# Patient Record
Sex: FEMALE | Race: BLACK OR AFRICAN AMERICAN | URBAN - METROPOLITAN AREA
[De-identification: names, ages, dates, MRNs, and addresses within clinical notes are randomized per-mention and may not be internally consistent; named-entity substitution may affect disease eponyms.]

---

## 2017-06-01 ENCOUNTER — ED HISTORICAL/CONVERTED ENCOUNTER (OUTPATIENT)
Dept: OTHER | Age: 37
End: 2017-06-01

## 2018-03-31 LAB
CREATININE, EXTERNAL: 0.67
LDL-C, EXTERNAL: 90

## 2018-04-01 LAB — HBA1C MFR BLD HPLC: 15.3 %

## 2018-05-09 ENCOUNTER — OFFICE VISIT (OUTPATIENT)
Dept: ENDOCRINOLOGY | Age: 38
End: 2018-05-09

## 2018-05-09 VITALS
HEIGHT: 64 IN | WEIGHT: 181.9 LBS | BODY MASS INDEX: 31.05 KG/M2 | RESPIRATION RATE: 16 BRPM | OXYGEN SATURATION: 95 % | DIASTOLIC BLOOD PRESSURE: 85 MMHG | SYSTOLIC BLOOD PRESSURE: 129 MMHG | TEMPERATURE: 97.3 F | HEART RATE: 93 BPM

## 2018-05-09 DIAGNOSIS — E11.65 UNCONTROLLED TYPE 2 DIABETES MELLITUS WITH HYPERGLYCEMIA, WITH LONG-TERM CURRENT USE OF INSULIN (HCC): ICD-10-CM

## 2018-05-09 DIAGNOSIS — Z79.4 UNCONTROLLED TYPE 2 DIABETES MELLITUS WITH HYPERGLYCEMIA, WITH LONG-TERM CURRENT USE OF INSULIN (HCC): ICD-10-CM

## 2018-05-09 DIAGNOSIS — E11.65 TYPE 2 DIABETES MELLITUS WITH HYPERGLYCEMIA, WITH LONG-TERM CURRENT USE OF INSULIN (HCC): Primary | ICD-10-CM

## 2018-05-09 DIAGNOSIS — Z79.4 TYPE 2 DIABETES MELLITUS WITH HYPERGLYCEMIA, WITH LONG-TERM CURRENT USE OF INSULIN (HCC): Primary | ICD-10-CM

## 2018-05-09 DIAGNOSIS — E78.2 MIXED HYPERLIPIDEMIA: ICD-10-CM

## 2018-05-09 DIAGNOSIS — I10 ESSENTIAL HYPERTENSION WITH GOAL BLOOD PRESSURE LESS THAN 140/90: ICD-10-CM

## 2018-05-09 PROBLEM — E11.40 TYPE 2 DIABETES MELLITUS WITH DIABETIC NEUROPATHY (HCC): Status: ACTIVE | Noted: 2018-05-09

## 2018-05-09 RX ORDER — FENOFIBRATE 48 MG/1
48 TABLET, COATED ORAL DAILY
COMMUNITY

## 2018-05-09 RX ORDER — INSULIN ASPART 100 [IU]/ML
INJECTION, SUSPENSION SUBCUTANEOUS
COMMUNITY
End: 2018-05-09 | Stop reason: ALTCHOICE

## 2018-05-09 RX ORDER — ATORVASTATIN CALCIUM 10 MG/1
10 TABLET, FILM COATED ORAL DAILY
COMMUNITY

## 2018-05-09 RX ORDER — LANCETS 28 GAUGE
EACH MISCELLANEOUS
Qty: 100 LANCET | Refills: 11 | Status: SHIPPED | OUTPATIENT
Start: 2018-05-09

## 2018-05-09 RX ORDER — INSULIN PUMP SYRINGE, 3 ML
EACH MISCELLANEOUS
Qty: 1 KIT | Refills: 0 | Status: SHIPPED | OUTPATIENT
Start: 2018-05-09

## 2018-05-09 NOTE — PATIENT INSTRUCTIONS
Check blood sugars before breakfast and at bedtime. Low blood glucose is less than 70     Maintain the log and bring it all your appointments    If the bedtime sugars are less than 100 ,eat a 15 gm snack. Start Janumet 1 tab twice day ( s top Metformin )     NPH ( Novolin N ) 40 units in AM  and 20 units at bed time ( stop Novolin 70/30)     Additional Novolin R ( fast acting ) for  high blood sugars     150-200 mg   3 units   201-250 mg   6 units   251-300 mg   9 units   301-350 mg   12 units   351-400 mg   15 units         Exercising for 30 minutes at least 5 days per week has been shown to increase the lifespan of diabetics. We encourage an active lifestyle that includes regular exercise. You may benefit from the [facility] #[phone]     For diet information go to www. EATRIGHT. org     Diabetes is the leading cause of blindness in the [place] It is important that you see an eye doctor every year for a dilated retinal exam     Diabetes is the leading cause of amputations in the [place] It is very important that you keep an eye on the condition of you feet. Look for any cuts, calluses, ulcers, fungal infections, rashes, or nail problems. Diabetics need to be seen several times a year by their physician for fasting labs and monitoring of their diabetes. Prevention is the key to keeping diabetics out of trouble     Obtain a flu shot each Fall      What should you know about eating carbs? Managing the amount of carbohydrate (carbs) you eat is an important part of healthy meals when you have diabetes. Carbohydrate is found in many foods. · Learn which foods have carbs. And learn the amounts of carbs in different foods. · Bread, cereal, pasta, and rice have about 15 grams of carbs in a serving. A serving is 1 slice of bread (1 ounce), ½ cup of cooked cereal, or 1/3 cup of cooked pasta or rice. · Fruits have 15 grams of carbs in a serving.  A serving is 1 small fresh fruit, such as an apple or orange; ½ of a banana; ½ cup of cooked or canned fruit; ½ cup of fruit juice; 1 cup of melon or raspberries; or 2 tablespoons of dried fruit. · Milk and no-sugar-added yogurt have 15 grams of carbs in a serving. A serving is 1 cup of milk or 2/3 cup of no-sugar-added yogurt. · Starchy vegetables have 15 grams of carbs in a serving. A serving is ½ cup of mashed potatoes or sweet potato; 1 cup winter squash; ½ of a small baked potato; ½ cup of cooked beans; or ½ cup cooked corn or green peas. · Learn how much carbs to eat each day and at each meal. A dietitian or CDE can teach you how to keep track of the amount of carbs you eat. This is called carbohydrate counting. · If you are not sure how to count carbohydrate grams, use the Plate Method to plan meals. It is a good, quick way to make sure that you have a balanced meal. It also helps you spread carbs throughout the day. · Divide your plate by types of foods. Put non-starchy vegetables on half the plate, meat or other protein food on one-quarter of the plate, and a grain or starchy vegetable in the final quarter of the plate. To this you can add a small piece of fruit and 1 cup of milk or yogurt, depending on how many carbs you are supposed to eat at a meal.  · Try to eat about the same amount of carbs at each meal. Do not \"save up\" your daily allowance of carbs to eat at one meal.  · Proteins have very little or no carbs per serving. Examples of proteins are beef, chicken, turkey, fish, eggs, tofu, cheese, cottage cheese, and peanut butter. A serving size of meat is 3 ounces, which is about the size of a deck of cards. Examples of meat substitute serving sizes (equal to 1 ounce of meat) are 1/4 cup of cottage cheese, 1 egg, 1 tablespoon of peanut butter, and ½ cup of tofu. How can you eat out and still eat healthy? · Learn to estimate the serving sizes of foods that have carbohydrate.  If you measure food at home, it will be easier to estimate the amount in a serving of restaurant food. · If you eat more carbohydrate at a meal than you had planned, take a walk or do other exercise. This will help lower your blood sugar. Please remember to bring your meter and/or log to every visit. Also, be sure to have a dilated diabetic eye exam done annually. Refills -Please call your pharmacy and have them send us a refill request.  Results - Allow up to a week for lab results to be processed and reviewed. Phone calls - Allow up to 24 hours for non-urgent calls to be returned. Prior Authorization - May take up to 2 weeks to process depending on your insurance. Forms - FMLA, DMV, Patient Assistance, etc. will take up to 2 weeks to process. Cancellations - Please notify the office in advance if you cannot keep your appointment. Samples - Will only be dispensed at visits as supplies are limited. If you are having a medical emergency, call 911.

## 2018-05-09 NOTE — MR AVS SNAPSHOT
[address] [facility] [address] 
[phone] Patient: Shyanne [facility] MRN: [id number] Visit Information Date & Time Provider Department Dept. Phone Encounter #  
 5/9/2018 10:30 AM Maryann Ramirez MD [facility] [phone] [id number] Follow-up Instructions Return in about 3 months (around 8/9/2018). Upcoming Health Maintenance Date Due  
 FOOT EXAM Q1 2/10/1988 EYE EXAM RETINAL OR DILATED Q1 2/10/1988 Pneumococcal 19-64 Medium Risk (1 of 1 - PPSV23) 2/10/1997 DTaP/Tdap/Td series (1 - Tdap) 2/10/1999 PAP AKA CERVICAL CYTOLOGY 2/10/1999 HEMOGLOBIN A1C Q6M 8/19/2016 MICROALBUMIN Q1 2/19/2017 LIPID PANEL Q1 2/19/2017 Influenza Age 5 to Adult 8/1/2018 Allergies as of 5/9/2018  Review Complete On: 5/9/2018 By: Nguyễn Evans LPN No Known Allergies Current Immunizations  Never Reviewed No immunizations on file. Not reviewed this visit You Were Diagnosed With   
  
 Codes Comments Type 2 diabetes mellitus with hyperglycemia, with long-term current use of insulin (HCC)    -  Primary ICD-10-CM: E11.65, Z79.4 ICD-9-CM: 250.00, 790.29, V58.67 Mixed hyperlipidemia     ICD-10-CM: E78.2 ICD-9-CM: 272.2 Essential hypertension with goal blood pressure less than 140/90     ICD-10-CM: I10 
ICD-9-CM: 401.9 Vitals BP Pulse Temp Resp Height(growth percentile) Weight(growth percentile) 129/85 (BP 1 Location: Left arm, BP Patient Position: Sitting) 93 97.3 °F (36.3 °C) (Oral) 16 5' 4\" (1.626 m) 181 lb 14.4 oz (82.5 kg) SpO2 BMI OB Status Smoking Status 95% 31.22 kg/m2 Having regular periods Never Smoker Vitals History BMI and BSA Data Body Mass Index Body Surface Area  
 31.22 kg/m 2 1.93 m 2 Preferred Pharmacy Pharmacy Name Phone  [facility], [address] [place] [address] [address] [place] [address] [phone] Your Updated Medication List  
  
   
This list is accurate as of 5/9/18 11:20 AM.  Always use your most recent med list.  
  
  
  
  
 atorvastatin 10 mg tablet Commonly known as:  LIPITOR Take 10 mg by mouth daily. Blood-Glucose Meter monitoring kit Test blood glucose 3 times daily Dx Code: E11.65  
  
 fenofibrate nanocrystallized 48 mg tablet Commonly known as:  [employer] Take 48 mg by mouth daily. fexofenadine 180 mg tablet Commonly known as:  Katie Munda Take 180 mg by mouth daily. gabapentin 300 mg capsule Commonly known as:  NEURONTIN Take 300 mg by mouth three (3) times daily. glipiZIDE 10 mg tablet Commonly known as:  Luis Angeljennimargarette  Take 10 mg by mouth daily. glucose blood VI test strips strip Commonly known as:  blood glucose test  
Test blood glucose 3 times daily Dx Code: E11.65  
  
 insulin  unit/mL injection Commonly known as:  NovoLIN N NPH U-100 Insulin Inject 40 units in AM  and 20 units at bed time ( stop Novolin 70/30 )  
  
 insulin regular 100 unit/mL injection Commonly known as:  NovoLIN R Regular U-100 Insuln Use with sliding scale Max units daily: 45  
  
 insulin syringe-needle U-100 1 mL 31 gauge x 15/64\" Syrg 1 Syringe by Does Not Apply route four (4) times daily. Lancets Misc Test blood glucose 3 times daily Dx Code: E11.65  
  
 lisinopril 10 mg tablet Commonly known as:  Reinier Handing Take 10 mg by mouth daily. lovastatin 20 mg tablet Commonly known as:  MEVACOR Take 20 mg by mouth nightly. metFORMIN 1,000 mg tablet Commonly known as:  GLUCOPHAGE Take 1,000 mg by mouth two (2) times daily (with meals). NovoLOG Mix 70-30FlexPen U-100 100 unit/mL (70-30) Inpn Generic drug:  insulin aspart protamine/insulin aspart  
by SubCUTAneous route. 20 units in the am and 80 units QHS Follow-up Instructions  Return in about 3 months (around 8/9/2018). Patient Instructions Check blood sugars before breakfast and at bedtime. Low blood glucose is less than 70 Maintain the log and bring it all your appointments If the bedtime sugars are less than 100 ,eat a 15 gm snack. Start Janumet 1 tab twice day ( s top Metformin )  
 
NPH ( Novolin N ) 40 units in AM  and 20 units at bed time ( stop Novolin 70/30) Additional Novolin R ( fast acting ) for  high blood sugars 150-200 mg   3 units 201-250 mg   6 units 251-300 mg   9 units 301-350 mg   12 units 351-400 mg   15 units Exercising for 30 minutes at least 5 days per week has been shown to increase the lifespan of diabetics. We encourage an active lifestyle that includes regular exercise. You may benefit from the [facility] at [facility] #[phone] For diet information go to www. EATRIGHT. org  
 
Diabetes is the leading cause of blindness in the [place] It is important that you see an eye doctor every year for a dilated retinal exam  
 
Diabetes is the leading cause of amputations in the [place] It is very important that you keep an eye on the condition of you feet. Look for any cuts, calluses, ulcers, fungal infections, rashes, or nail problems. Diabetics need to be seen several times a year by their physician for fasting labs and monitoring of their diabetes. Prevention is the key to keeping diabetics out of trouble Obtain a flu shot each Fall What should you know about eating carbs? Managing the amount of carbohydrate (carbs) you eat is an important part of healthy meals when you have diabetes. Carbohydrate is found in many foods. · Learn which foods have carbs. And learn the amounts of carbs in different foods. · Bread, cereal, pasta, and rice have about 15 grams of carbs in a serving. A serving is 1 slice of bread (1 ounce), ½ cup of cooked cereal, or 1/3 cup of cooked pasta or rice. · Fruits have 15 grams of carbs in a serving.  A serving is 1 small fresh fruit, such as an apple or orange; ½ of a banana; ½ cup of cooked or canned fruit; ½ cup of fruit juice; 1 cup of melon or raspberries; or 2 tablespoons of dried fruit. · Milk and no-sugar-added yogurt have 15 grams of carbs in a serving. A serving is 1 cup of milk or 2/3 cup of no-sugar-added yogurt. · Starchy vegetables have 15 grams of carbs in a serving. A serving is ½ cup of mashed potatoes or sweet potato; 1 cup winter squash; ½ of a small baked potato; ½ cup of cooked beans; or ½ cup cooked corn or green peas. · Learn how much carbs to eat each day and at each meal. A dietitian or CDE can teach you how to keep track of the amount of carbs you eat. This is called carbohydrate counting. · If you are not sure how to count carbohydrate grams, use the Plate Method to plan meals. It is a good, quick way to make sure that you have a balanced meal. It also helps you spread carbs throughout the day. · Divide your plate by types of foods. Put non-starchy vegetables on half the plate, meat or other protein food on one-quarter of the plate, and a grain or starchy vegetable in the final quarter of the plate. To this you can add a small piece of fruit and 1 cup of milk or yogurt, depending on how many carbs you are supposed to eat at a meal. 
· Try to eat about the same amount of carbs at each meal. Do not \"save up\" your daily allowance of carbs to eat at one meal. 
· Proteins have very little or no carbs per serving. Examples of proteins are beef, chicken, turkey, fish, eggs, tofu, cheese, cottage cheese, and peanut butter. A serving size of meat is 3 ounces, which is about the size of a deck of cards. Examples of meat substitute serving sizes (equal to 1 ounce of meat) are 1/4 cup of cottage cheese, 1 egg, 1 tablespoon of peanut butter, and ½ cup of tofu. How can you eat out and still eat healthy? · Learn to estimate the serving sizes of foods that have carbohydrate.  If you measure food at home, it will be easier to estimate the amount in a serving of restaurant food. · If you eat more carbohydrate at a meal than you had planned, take a walk or do other exercise. This will help lower your blood sugar. Please remember to bring your meter and/or log to every visit. Also, be sure to have a dilated diabetic eye exam done annually. Refills -Please call your pharmacy and have them send us a refill request. 
Results - Allow up to a week for lab results to be processed and reviewed. Phone calls - Allow up to 24 hours for non-urgent calls to be returned. Prior Authorization - May take up to 2 weeks to process depending on your insurance. Forms - FMLA, DMV, Patient Assistance, etc. will take up to 2 weeks to process. Cancellations - Please notify the office in advance if you cannot keep your appointment. Samples - Will only be dispensed at visits as supplies are limited. If you are having a medical emergency, call 911. Introducing [facility]! [employer] introduces [employer]ee patient portal. Now you can access parts of your medical record, email your doctor's office, and request medication refills online. 1. In your internet browser, go to [email or web address] 2. Click on the First Time User? Click Here link in the Sign In box. You will see the New Member Sign Up page. 3. Enter your Rezee Access Code exactly as it appears below. You will not need to use this code after youve completed the sign-up process. If you do not sign up before the expiration date, you must request a new code. · Rezee Access Code: [email or web address] Expires: 8/7/2018 11:20 AM 
 
4. Enter the last four digits of your Social Security Number (xxxx) and Date of Birth (mm/dd/yyyy) as indicated and click Submit. You will be taken to the next sign-up page. 5. Create a Rezee ID.  This will be your Rezee login ID and cannot be changed, so think of one that is secure and easy to remember. 6. Create a [employer] password. You can change your password at any time. 7. Enter your Password Reset Question and Answer. This can be used at a later time if you forget your password. 8. Enter your e-mail address. You will receive e-mail notification when new information is available in [address] 9. Click Sign Up. You can now view and download portions of your medical record. 10. Click the Download Summary menu link to download a portable copy of your medical information. If you have questions, please visit the Frequently Asked Questions section of the [employer] website. Remember, [employer] is NOT to be used for urgent needs. For medical emergencies, dial 911. Now available from your iPhone and Android! Please provide this summary of care documentation to your next provider. Your primary care clinician is listed as Donnell Officer. If you have any questions after today's visit, please call [phone].

## 2018-05-09 NOTE — LETTER
NOTIFICATION RETURN TO WORK / SCHOOL 
 
5/9/2018 11:25 AM 
 
Ms. Bre Thomas [id number] [address] To Whom It May Concern: 
 
Bre Thomas is currently under the care of [address] [address]. She will return to work/school on: 05/09/2018. If there are questions or concerns please have the patient contact our office. Sincerely, Mell Melton MD

## 2018-05-09 NOTE — PROGRESS NOTES
Christophe Wei is a 34 y.o. female here for   Chief Complaint   Patient presents with    Diabetes       Functional glucose monitor and record keeping system? - needs meter  Eye exam within last year? - [address] exam within last year? - due    1. Have you been to the ER, urgent care clinic since your last visit? Hospitalized since your last visit? -[facility] last year for neuropathy    2. Have you seen or consulted any other health care providers outside of the [address] [place] [address] since your last visit? Include any pap smears or colon screening. -PCP      Lab Results   Component Value Date/Time    Hemoglobin A1c, External 13.7 02/19/2016       Wt Readings from Last 3 Encounters:   05/02/16 181 lb 1.6 oz (82.1 kg)     Temp Readings from Last 3 Encounters:   05/02/16 97.8 °F (36.6 °C) (Oral)     BP Readings from Last 3 Encounters:   05/02/16 115/72     Pulse Readings from Last 3 Encounters:   05/02/16 (!) 105

## 2018-05-09 NOTE — PROGRESS NOTES
[facility]               Socorro Montejo MD        [address] 78 [phone] Fax [phone]               Patient Information  Date:5/9/2018  Name : Carmine Lee 34 y.o.     YOB: 1980         Referred by: Donnell Officer, NICOLE         Chief Complaint   Patient presents with    Diabetes       History of Present Illness: Carmine Lee is a 34 y.o. female here for follow-up of  Type 2 Diabetes Mellitus. Type 2 Diabetes was diagnosed at 23 yrs . End organ effects of diabetes: peripheral neuropathy. Cardiovascular risk factors: dyslipidemia, diabetes mellitus, obesity, sedentary life style     She was seen more than 2 years ago and did not follow-up  She was off insulin and other medications  for a while. Recently started on insulin  Not able to get insulin through insurance  Complains of polyuria, polydipsia, dehydration  Frequent yeast infections        Wt Readings from Last 3 Encounters:   05/09/18 181 lb 14.4 oz (82.5 kg)   05/02/16 181 lb 1.6 oz (82.1 kg)       BP Readings from Last 3 Encounters:   05/09/18 129/85   05/02/16 115/72           Past Medical History:   Diagnosis Date    [facility]     HTN (hypertension)     Hyperlipidemia     Neuropathy      Current Outpatient Prescriptions   Medication Sig    insulin aspart protamine/insulin aspart (NOVOLOG MIX 70-30FLEXPEN U-100) 100 unit/mL (70-30) inpn by SubCUTAneous route. 20 units in the am and 80 units QHS    fenofibrate nanocrystallized (TRICOR) 48 mg tablet Take 48 mg by mouth daily.  atorvastatin (LIPITOR) 10 mg tablet Take 10 mg by mouth daily.  metFORMIN (GLUCOPHAGE) 1,000 mg tablet Take 1,000 mg by mouth two (2) times daily (with meals).  gabapentin (NEURONTIN) 300 mg capsule Take 300 mg by mouth three (3) times daily.     Lancets misc Test blood glucose 3 times daily Dx Code: E11.65    insulin syringe-needle U-100 1 mL 31 gauge x 15/64\" syrg 1 Syringe by Does Not Apply route four (4) times daily.  glipiZIDE (GLUCOTROL) 10 mg tablet Take 10 mg by mouth daily.  lisinopril (PRINIVIL, ZESTRIL) 10 mg tablet Take 10 mg by mouth daily.  fexofenadine (ALLEGRA) 180 mg tablet Take 180 mg by mouth daily.  lovastatin (MEVACOR) 20 mg tablet Take 20 mg by mouth nightly.  insulin NPH (NOVOLIN N) 100 unit/mL injection Inject 40 units in AM  and 20 units at bed time ( stop Novolin 70/30 )    insulin regular (NOVOLIN R) 100 unit/mL injection Use with sliding scale Max units daily: 45    Blood-Glucose Meter monitoring kit Test blood glucose 3 times daily Dx Code: E11.65    glucose blood VI test strips (BLOOD GLUCOSE TEST) strip Test blood glucose 3 times daily Dx Code: E11.65     No current facility-administered medications for this visit. No Known Allergies      Review of Systems:  -   - Respiratory: no cough no shortness of breath  - Gastrointestinal: no dysphagia no  abdominal pain  - Musculoskeletal: no joint pains no  weakness  - Integumentary: no rashes  - Neurological: no numbness, tingling, no  headaches  - Psychiatric: no depression no  anxiety  - Endocrine: no heat or cold intolerance    Physical Examination:   Blood pressure 129/85, pulse 93, temperature 97.3 °F (36.3 °C), temperature source Oral, resp. rate 16, height 5' 4\" (1.626 m), weight 181 lb 14.4 oz (82.5 kg), SpO2 95 %. Estimated body mass index is 31.22 kg/(m^2) as calculated from the following:    Height as of this encounter: 5' 4\" (1.626 m). -   Weight as of this encounter: 181 lb 14.4 oz (82.5 kg).   - General: pleasant, no distress, good eye contact  - HEENT: no pallor, no periorbital edema, EOMI  - Neck: supple,   - Cardiovascular: regular, normal rate, normal S1 and S2, no murmurs  - Respiratory: clear to auscultation bilaterally  - Gastrointestinal: soft, nontender, nondistended,  BS +  - Musculoskeletal: no proximal muscle weakness in upper or lower extremities  - Integumentary:   - Neurological: ,alert and oriented  - Psychiatric: normal mood and affect  - Skin: color, texture, turgor normal.     Diabetic foot exam: May 2018    Left:     Vibratory sensation absent   Filament test decreased sensation with micro filament   Pulse DP: 1+    Deformities: None  Right:    Vibratory sensation absent   Filament test decreased sensation with micro filament   Pulse DP: 1+   Deformities: None      Data Reviewed:     [] Glucose records reviewed. [] See glucose records for details (to be scanned). [] A1C  [] Reviewed labs        Assessment/Plan:     1. Type 2 diabetes mellitus with hyperglycemia, with long-term current use of insulin ([facility]    2. Mixed hyperlipidemia    3. Essential hypertension with goal blood pressure less than 140/90        1. Type 2 Diabetes Mellitus  Lab Results   Component Value Date/Time    Hemoglobin A1c, External 13.7 02/19/2016     She has severe hyperglycemia, symptomatic  Blood glucose more than 300  Janumet, stop metformin  NPH ( Novolin N ) 40 units in AM  and 20 units at bed time ( stop Novolin 70/30)   Advised to check glucose 3 - 4 times daily  Stressed the importance of low carbohydrate diet  Diabetic issues reviewed : glycemic goals , written exchange diet given, low carbohydrate diet, weight control , home glucose monitoring emphasized,  hypoglycemia management and long term diabetic complications discussed. FLU annually ,Pneumovax ,aspirin daily,annual eye exam,microalbumin    2. HTN : Continue current therapy     3. Hyperlipidemia : Continue statin. 4.Obesity:Body mass index is 31.22 kg/(m^2). Discussed about the importance of exercise and carbohydrate portion control. Follow-up Disposition: Not on File    Thank you for allowing me to participate in the care of this patient.     Claire Fung MD      Patient verbalized understanding

## 2018-05-17 ENCOUNTER — ED HISTORICAL/CONVERTED ENCOUNTER (OUTPATIENT)
Dept: OTHER | Age: 38
End: 2018-05-17

## 2020-01-06 ENCOUNTER — OFFICE VISIT (OUTPATIENT)
Dept: ENDOCRINOLOGY | Age: 40
End: 2020-01-06

## 2020-01-06 VITALS
SYSTOLIC BLOOD PRESSURE: 126 MMHG | OXYGEN SATURATION: 99 % | HEIGHT: 64 IN | RESPIRATION RATE: 16 BRPM | DIASTOLIC BLOOD PRESSURE: 82 MMHG | TEMPERATURE: 97.4 F | BODY MASS INDEX: 32.27 KG/M2 | WEIGHT: 189 LBS | HEART RATE: 95 BPM

## 2020-01-06 DIAGNOSIS — Z79.4 TYPE 2 DIABETES MELLITUS WITH DIABETIC NEUROPATHY, WITH LONG-TERM CURRENT USE OF INSULIN (HCC): Primary | ICD-10-CM

## 2020-01-06 DIAGNOSIS — Z91.199 NONCOMPLIANCE: ICD-10-CM

## 2020-01-06 DIAGNOSIS — I10 ESSENTIAL HYPERTENSION WITH GOAL BLOOD PRESSURE LESS THAN 140/90: ICD-10-CM

## 2020-01-06 DIAGNOSIS — E11.40 TYPE 2 DIABETES MELLITUS WITH DIABETIC NEUROPATHY, WITH LONG-TERM CURRENT USE OF INSULIN (HCC): Primary | ICD-10-CM

## 2020-01-06 DIAGNOSIS — E78.2 MIXED HYPERLIPIDEMIA: ICD-10-CM

## 2020-01-06 DIAGNOSIS — E11.65 UNCONTROLLED TYPE 2 DIABETES MELLITUS WITH HYPERGLYCEMIA, WITH LONG-TERM CURRENT USE OF INSULIN (HCC): ICD-10-CM

## 2020-01-06 DIAGNOSIS — Z79.4 UNCONTROLLED TYPE 2 DIABETES MELLITUS WITH HYPERGLYCEMIA, WITH LONG-TERM CURRENT USE OF INSULIN (HCC): ICD-10-CM

## 2020-01-06 RX ORDER — METFORMIN HYDROCHLORIDE 1000 MG/1
1000 TABLET ORAL 2 TIMES DAILY WITH MEALS
COMMUNITY
End: 2020-01-06 | Stop reason: ALTCHOICE

## 2020-01-06 NOTE — PROGRESS NOTES
Kashif Shah is a 37 y.o. female here for   Chief Complaint   Patient presents with    Diabetes     LV 5/2018    Diabetic Foot Exam       Functional glucose monitor and record keeping system? -yes   Eye exam within last year? -[address] exam within last year? - due    1. Have you been to the ER, urgent care clinic since your last visit? Hospitalized since your last visit? -[facility] 2 months ago for BG in 500s    2. Have you seen or consulted any other health care providers outside of the [address] [place] [address] since your last visit? Include any pap smears or colon screening. -PCP

## 2020-01-06 NOTE — PROGRESS NOTES
Order placed for pt,  per Verbal Order with read back from Dr MELCHOR [facility] 1/6/2020

## 2020-01-06 NOTE — PATIENT INSTRUCTIONS
If you start Janumet then discontinue metformin      Novolin N 30 units in AM and 20 units at night    Janumet twice day    Novolin R ( fast acting insulin )    Blood sugar  Breakfast/Lunch/Dinner        150-200  Add  3  Units       201-250  Add  6 Units       251-300  Add  9 Units       301-350  Add 12  Units        351-400  Add 15  Units

## 2020-01-06 NOTE — LETTER
1/6/20 Patient: Ralph Caruso YOB: 1980 Date of Visit: 1/6/2020 Angel Millan NP 
[address] [place] [address] VIA Facsimile: [phone] Dear Angel Millan NP, Thank you for referring Ms. Ralph Caruso to [address] [place] [address] for evaluation. My notes for this consultation are attached. If you have questions, please do not hesitate to call me. I look forward to following your patient along with you. Sincerely, Brittany Hi MD

## 2020-01-06 NOTE — PROGRESS NOTES
Neftali Drew MD    Patient Information  Date:1/6/2020  Name : Lise Pacheco 37 y.o.     YOB: 1980         Referred by: Benjamin Faith NP         Chief Complaint   Patient presents with    Diabetes     LV 5/2018    Diabetic Foot Exam       History of Present Illness: Lise Pacheco is a 37 y.o. female here for follow-up of  Type 2 Diabetes Mellitus. Type 2 Diabetes was diagnosed at 20 yrs of age . End organ effects of diabetes: peripheral neuropathy. Cardiovascular risk factors: dyslipidemia, diabetes mellitus, obesity, sedentary life style     Follow-up is very poor with me, seen once in 2016, then in May 2018  Her blood glucose was high and went to the hospital, she was not taking the insulin consistently. She still on a lower dose than what I had prescribed, she was seen 1-1/2-year ago  Not on Janumet  She did not bring the meter, reportedly checking twice daily, the blood glucose in the morning less than 150, at bedtime less than 170  Complains of polyuria, polydipsia, pain in the feet      Delete that no chest pain, shortness of breath        Wt Readings from Last 3 Encounters:   01/06/20 189 lb (85.7 kg)   05/09/18 181 lb 14.4 oz (82.5 kg)   05/02/16 181 lb 1.6 oz (82.1 kg)       BP Readings from Last 3 Encounters:   01/06/20 126/82   05/09/18 129/85   05/02/16 115/72           Past Medical History:   Diagnosis Date    [facility]     HTN (hypertension)     Hyperlipidemia     Neuropathy      Current Outpatient Medications   Medication Sig    fenofibrate nanocrystallized (TRICOR) 48 mg tablet Take 48 mg by mouth daily.  atorvastatin (LIPITOR) 10 mg tablet Take 10 mg by mouth daily.     insulin NPH (NOVOLIN N NPH U-100 INSULIN) 100 unit/mL injection Inject 40 units in AM and 20 units at bed time ( stop Novolin 70/30) (Patient taking differently: Inject 20 units in AM and 20 units at bed time ( stop Novolin 70/30))    insulin regular (NOVOLIN R REGULAR U-100 INSULN) 100 unit/mL injection Use with sliding scale Max units daily: 45    Blood-Glucose Meter (FREESTYLE FREEDOM LITE) monitoring kit Test BID Dx Code: E11.65    glucose blood VI test strips (FREESTYLE LITE STRIPS) strip Test BID Dx Code: E11.65    lancets (FREESTYLE LANCETS) 28 gauge misc Test BID Dx Code: E11.65    gabapentin (NEURONTIN) 300 mg capsule Take 300 mg by mouth three (3) times daily.  fexofenadine (ALLEGRA) 180 mg tablet Take 180 mg by mouth daily.  insulin syringe-needle U-100 1 mL 31 gauge x 15/64\" syrg 1 Syringe by Does Not Apply route four (4) times daily.  SITagliptin-metFORMIN (JANUMET XR) 50-1,000 mg TM24 TAKE 1 TABLET BY MOUTH TWICE DAILY    lisinopril (PRINIVIL, ZESTRIL) 10 mg tablet Take 10 mg by mouth daily. No current facility-administered medications for this visit. No Known Allergies      Review of Systems:  -   - Respiratory: no cough no shortness of breath  - Gastrointestinal: no dysphagia no  abdominal pain  - Musculoskeletal: no joint pains no  weakness  - Integumentary: no rashes  - Neurological: no numbness, tingling, no  headaches  - Psychiatric: no depression no  anxiety  - Endocrine: no heat or cold intolerance    Physical Examination:   Blood pressure 126/82, pulse 95, temperature 97.4 °F (36.3 °C), temperature source Oral, resp. rate 16, height 5' 4\" (1.626 m), weight 189 lb (85.7 kg), SpO2 99 %. Estimated body mass index is 32.44 kg/m² as calculated from the following:    Height as of this encounter: 5' 4\" (1.626 m). -   Weight as of this encounter: 189 lb (85.7 kg).   - General: pleasant, no distress, good eye contact  - HEENT: no pallor, no periorbital edema, EOMI  - Neck: supple,   - Cardiovascular: regular, normal rate, normal S1 and S2, no murmurs  - Respiratory: clear to auscultation bilaterally  - Gastrointestinal: soft, nontender, nondistended,  BS +  - Musculoskeletal: no proximal muscle weakness in upper or lower extremities  - Integumentary:   - Neurological: ,alert and oriented  - Psychiatric: normal mood and affect  - Skin: color, texture, turgor normal.     Diabetic foot exam: January 2020    Left:     Vibratory sensation absent   Filament test decreased sensation with micro filament   Pulse DP: 1+    Deformities: None  Right:    Vibratory sensation absent   Filament test decreased sensation with micro filament   Pulse DP: 1+   Deformities: None            Assessment/Plan:     1. Type 2 diabetes mellitus with diabetic neuropathy, with long-term current use of insulin ([address]    2. Essential hypertension with goal blood pressure less than 140/90    3. Mixed hyperlipidemia    4. Noncompliance        1. Type 2 Diabetes Mellitus  Lab Results   Component Value Date/Time    Hemoglobin A1c, External 15.3 04/01/2018     Follow-up is very poor with me, she was seen 1-1/2-year ago  I have no data to adjust the insulin, labs today  Severe hyperglycemia  Janumet, discontinue metformin, if Janumet is expensive then will go back on metformin  NPH ( Novolin N ) 30 units in AM  and 20 units at bed time ( stop Novolin 70/30)   Advised to check glucose 2 times daily  Stressed the importance of low carbohydrate diet  Diabetic issues reviewed : glycemic goals , written exchange diet given, low carbohydrate diet, weight control , home glucose monitoring emphasized,  hypoglycemia management and long term diabetic complications discussed. FLU annually ,Pneumovax ,aspirin daily,annual eye exam,microalbumin    2. HTN : Continue current therapy     3. Hyperlipidemia : Continue statin. 4.Obesity:Body mass index is 32.44 kg/m². Discussed about the importance of exercise and carbohydrate portion control. 5.  Peripheral neuropathy    Follow-up and Dispositions    · Return in about 4 weeks (around 2/3/2020). Thank you for allowing me to participate in the care of this patient.     Edda Piedra MD      Patient verbalized understanding

## 2020-01-06 NOTE — LETTER
NOTIFICATION RETURN TO WORK / SCHOOL 
 
1/6/2020 12:35 PM 
 
Ms. Nati Schwartz 1972 [address] [place] To Whom It May Concern: 
 
Nati Schwartz is currently under the care of [address] [place] [address]. She will return to work/school on: 01/06/2020 If there are questions or concerns please have the patient contact our office. Sincerely, Krys Pathak MD

## 2020-01-09 ENCOUNTER — HOSPITAL ENCOUNTER (OUTPATIENT)
Dept: LAB | Age: 40
Discharge: HOME | End: 2020-01-09

## 2020-01-09 ENCOUNTER — LAB ONLY (OUTPATIENT)
Dept: ENDOCRINOLOGY | Age: 40
End: 2020-01-09

## 2020-01-09 DIAGNOSIS — E78.2 MIXED HYPERLIPIDEMIA: ICD-10-CM

## 2020-01-09 DIAGNOSIS — I10 ESSENTIAL HYPERTENSION WITH GOAL BLOOD PRESSURE LESS THAN 140/90: ICD-10-CM

## 2020-01-09 DIAGNOSIS — E11.40 TYPE 2 DIABETES MELLITUS WITH DIABETIC NEUROPATHY, WITH LONG-TERM CURRENT USE OF INSULIN (HCC): Primary | ICD-10-CM

## 2020-01-09 DIAGNOSIS — Z79.4 TYPE 2 DIABETES MELLITUS WITH DIABETIC NEUROPATHY, WITH LONG-TERM CURRENT USE OF INSULIN (HCC): ICD-10-CM

## 2020-01-09 DIAGNOSIS — Z79.4 TYPE 2 DIABETES MELLITUS WITH DIABETIC NEUROPATHY, WITH LONG-TERM CURRENT USE OF INSULIN (HCC): Primary | ICD-10-CM

## 2020-01-09 DIAGNOSIS — E11.40 TYPE 2 DIABETES MELLITUS WITH DIABETIC NEUROPATHY, WITH LONG-TERM CURRENT USE OF INSULIN (HCC): ICD-10-CM

## 2020-01-09 LAB
ANION GAP SERPL CALC-SCNC: 10 MMOL/L (ref 5–15)
BUN SERPL-MCNC: 14 MG/DL (ref 6–20)
BUN/CREAT SERPL: 16 (ref 12–20)
CALCIUM SERPL-MCNC: 8.5 MG/DL (ref 8.5–10.1)
CHLORIDE SERPL-SCNC: 100 MMOL/L (ref 97–108)
CO2 SERPL-SCNC: 27 MMOL/L (ref 21–32)
CREAT SERPL-MCNC: 0.87 MG/DL (ref 0.55–1.02)
GLUCOSE SERPL-MCNC: 237 MG/DL (ref 65–100)
POTASSIUM SERPL-SCNC: 4.5 MMOL/L (ref 3.5–5.1)
SODIUM SERPL-SCNC: 137 MMOL/L (ref 136–145)

## 2020-01-10 LAB
EST. AVERAGE GLUCOSE BLD GHB EST-MCNC: 295 MG/DL
HBA1C MFR BLD: 11.9 % (ref 4–5.6)
LDLC SERPL DIRECT ASSAY-MCNC: 87 MG/DL (ref 0–100)

## 2020-01-11 LAB — C PEPTIDE SERPL-MCNC: 2.5 NG/ML (ref 1.1–4.4)

## 2020-02-05 ENCOUNTER — OFFICE VISIT (OUTPATIENT)
Dept: ENDOCRINOLOGY | Age: 40
End: 2020-02-05

## 2020-02-05 VITALS
DIASTOLIC BLOOD PRESSURE: 81 MMHG | TEMPERATURE: 97.2 F | RESPIRATION RATE: 16 BRPM | OXYGEN SATURATION: 98 % | SYSTOLIC BLOOD PRESSURE: 120 MMHG | HEART RATE: 102 BPM | WEIGHT: 190 LBS | HEIGHT: 64 IN | BODY MASS INDEX: 32.44 KG/M2

## 2020-02-05 DIAGNOSIS — Z79.4 UNCONTROLLED TYPE 2 DIABETES MELLITUS WITH HYPERGLYCEMIA, WITH LONG-TERM CURRENT USE OF INSULIN (HCC): ICD-10-CM

## 2020-02-05 DIAGNOSIS — I10 ESSENTIAL HYPERTENSION WITH GOAL BLOOD PRESSURE LESS THAN 140/90: Primary | ICD-10-CM

## 2020-02-05 DIAGNOSIS — E11.65 UNCONTROLLED TYPE 2 DIABETES MELLITUS WITH HYPERGLYCEMIA, WITH LONG-TERM CURRENT USE OF INSULIN (HCC): ICD-10-CM

## 2020-02-05 NOTE — PROGRESS NOTES
Vy Hicks MD    Patient Information  Date:2/5/2020  Name : Karsten Hummel 37 y.o.     YOB: 1980         Referred by: Jayashree Jj NP         Chief Complaint   Patient presents with    Diabetes       History of Present Illness: Karsten Hummel is a 37 y.o. female here for follow-up of  Type 2 Diabetes Mellitus. Type 2 Diabetes was diagnosed at 20 yrs of age . End organ effects of diabetes: peripheral neuropathy. Cardiovascular risk factors: dyslipidemia, diabetes mellitus, obesity, sedentary life style     Follow-up is very poor with me, seen once in 2016, then in May 2018, January 2020  She is now taking the insulin twice daily, on Janumet  Blood glucose has improved  On NPH    No chest pain        Wt Readings from Last 3 Encounters:   02/05/20 190 lb (86.2 kg)   01/06/20 189 lb (85.7 kg)   05/09/18 181 lb 14.4 oz (82.5 kg)       BP Readings from Last 3 Encounters:   02/05/20 120/81   01/06/20 126/82   05/09/18 129/85           Past Medical History:   Diagnosis Date    [facility] ([facility]     HTN (hypertension)     Hyperlipidemia     Neuropathy      Current Outpatient Medications   Medication Sig    insulin NPH (NOVOLIN N NPH U-100 INSULIN) 100 unit/mL injection Inject 28 units in AM and 20 units at bed time ( stop Novolin 70/30)    SITagliptin-metFORMIN (JANUMET XR) 50-1,000 mg TM24 TAKE 1 TABLET BY MOUTH TWICE DAILY    fenofibrate nanocrystallized (TRICOR) 48 mg tablet Take 48 mg by mouth daily.  atorvastatin (LIPITOR) 10 mg tablet Take 10 mg by mouth daily.     insulin regular (NOVOLIN R REGULAR U-100 INSULN) 100 unit/mL injection Use with sliding scale Max units daily: 45    Blood-Glucose Meter (FREESTYLE FREEDOM LITE) monitoring kit Test BID Dx Code: E11.65    glucose blood VI test strips (FREESTYLE LITE STRIPS) strip Test BID Dx Code: E11.65    lancets (FREESTYLE LANCETS) 28 gauge misc Test BID Dx Code: E11.65    gabapentin (NEURONTIN) 300 mg capsule Take 300 mg by mouth three (3) times daily.  insulin syringe-needle U-100 1 mL 31 gauge x 15/64\" syrg 1 Syringe by Does Not Apply route four (4) times daily.  lisinopril (PRINIVIL, ZESTRIL) 10 mg tablet Take 10 mg by mouth daily.  fexofenadine (ALLEGRA) 180 mg tablet Take 180 mg by mouth daily. No current facility-administered medications for this visit. No Known Allergies      Review of Systems:  -   - Respiratory: no cough no shortness of breath  - Gastrointestinal: no dysphagia no  abdominal pain  - Musculoskeletal: no joint pains no  weakness  - Integumentary: no rashes  - Neurological: no numbness, tingling, no  headaches  - Psychiatric: no depression no  anxiety  - Endocrine: no heat or cold intolerance    Physical Examination:   Blood pressure 120/81, pulse (!) 102, temperature 97.2 °F (36.2 °C), temperature source Oral, resp. rate 16, height 5' 4\" (1.626 m), weight 190 lb (86.2 kg), SpO2 98 %. Estimated body mass index is 32.61 kg/m² as calculated from the following:    Height as of this encounter: 5' 4\" (1.626 m). -   Weight as of this encounter: 190 lb (86.2 kg).   - General: pleasant, no distress, good eye contact  - HEENT: no pallor, no periorbital edema, EOMI  - Neck: supple,   - Cardiovascular: regular, normal rate, normal S1 and S2,  - Respiratory: clear to auscultation bilaterally  - Gastrointestinal: soft, nontender, nondistended,  BS +  - Musculoskeletal: no proximal muscle weakness in upper or lower extremities  - Integumentary:   - Neurological: ,alert and oriented  - Psychiatric: normal mood and affect  - Skin: color, texture, turgor normal.     Diabetic foot exam: January 2020    Left:     Vibratory sensation absent   Filament test decreased sensation with micro filament   Pulse DP: 1+    Deformities: None  Right:    Vibratory sensation absent   Filament test decreased sensation with micro filament   Pulse DP: 1+   Deformities: None            Assessment/Plan:     1. Uncontrolled type 2 diabetes mellitus with hyperglycemia, with long-term current use of insulin ([facility]        1. Type 2 Diabetes Mellitus  Lab Results   Component Value Date/Time    Hemoglobin A1c 11.9 (H) 01/09/2020 10:16 AM    Hemoglobin A1c, External 15.3 04/01/2018     Follow-up is intermittent  She is checking the blood glucose, reviewed  Janumet    NPH ( Novolin N ) 28 units in AM  and 20 units at bed time ( stop Novolin 70/30)   Advised to check glucose 2 times daily    FLU annually ,Pneumovax ,aspirin daily,annual eye exam,microalbumin    2. HTN : Continue current therapy     3. Hyperlipidemia : Continue statin. 4.Obesity:Body mass index is 32.61 kg/m². Discussed about the importance of exercise and carbohydrate portion control. 5.  Peripheral neuropathy        Thank you for allowing me to participate in the care of this patient.     Hailey Naranjo MD      Patient verbalized understanding

## 2020-02-05 NOTE — PROGRESS NOTES
Sandeep Kelley is a 37 y.o. female here for   Chief Complaint   Patient presents with    Diabetes       1. Have you been to the ER, urgent care clinic since your last visit? Hospitalized since your last visit? -no    2. Have you seen or consulted any other health care providers outside of the [address] [place] [address] since your last visit?   Include any pap smears or colon screening.-no

## 2020-02-05 NOTE — PATIENT INSTRUCTIONS
If bedtime sugars are less than 100 , and still take Novolin N     Novolin N 28 units in AM and 20 units at night    Janumet twice day    Novolin R ( fast acting insulin )    Blood sugar  Breakfast/Lunch/Dinner        150-200  Add  3  Units       201-250  Add  6 Units       251-300  Add  9 Units       301-350  Add 12  Units        351-400  Add 15  Units

## 2020-02-14 DIAGNOSIS — E11.65 UNCONTROLLED TYPE 2 DIABETES MELLITUS WITH HYPERGLYCEMIA, WITH LONG-TERM CURRENT USE OF INSULIN (HCC): Primary | ICD-10-CM

## 2020-02-14 DIAGNOSIS — Z79.4 UNCONTROLLED TYPE 2 DIABETES MELLITUS WITH HYPERGLYCEMIA, WITH LONG-TERM CURRENT USE OF INSULIN (HCC): Primary | ICD-10-CM

## 2020-05-18 ENCOUNTER — TELEPHONE (OUTPATIENT)
Dept: ENDOCRINOLOGY | Age: 40
End: 2020-05-18

## 2020-05-18 DIAGNOSIS — Z79.4 UNCONTROLLED TYPE 2 DIABETES MELLITUS WITH HYPERGLYCEMIA, WITH LONG-TERM CURRENT USE OF INSULIN (HCC): Primary | ICD-10-CM

## 2020-05-18 DIAGNOSIS — Z79.4 UNCONTROLLED TYPE 2 DIABETES MELLITUS WITH HYPERGLYCEMIA, WITH LONG-TERM CURRENT USE OF INSULIN (HCC): ICD-10-CM

## 2020-05-18 DIAGNOSIS — E11.65 UNCONTROLLED TYPE 2 DIABETES MELLITUS WITH HYPERGLYCEMIA, WITH LONG-TERM CURRENT USE OF INSULIN (HCC): Primary | ICD-10-CM

## 2020-05-18 DIAGNOSIS — E78.2 MIXED HYPERLIPIDEMIA: ICD-10-CM

## 2020-05-18 DIAGNOSIS — E11.65 UNCONTROLLED TYPE 2 DIABETES MELLITUS WITH HYPERGLYCEMIA, WITH LONG-TERM CURRENT USE OF INSULIN (HCC): ICD-10-CM

## 2020-05-21 NOTE — LETTER
5/10/2018 9:55 PM 
 
Patient:  Coni Inman YOB: 1980 Date of Visit: 5/9/2018 Dear Paula Pate, NICOLE 
[address] [address] VIA Facsimile: [phone] 
 : Thank you for referring Ms. Coni Inman to me for evaluation/treatment. Below are the relevant portions of my assessment and plan of care. If you have questions, please do not hesitate to call me. I look forward to following MsCharles Alvares along with you. Sincerely, Anthony Black MD 
 
 Need refill of inhalers  The one that used to be called Advair??  And her emergency inhaler ??  To [facility]  Call if any questions

## 2020-05-29 DIAGNOSIS — E11.65 UNCONTROLLED TYPE 2 DIABETES MELLITUS WITH HYPERGLYCEMIA, WITH LONG-TERM CURRENT USE OF INSULIN (HCC): ICD-10-CM

## 2020-05-29 DIAGNOSIS — Z79.4 UNCONTROLLED TYPE 2 DIABETES MELLITUS WITH HYPERGLYCEMIA, WITH LONG-TERM CURRENT USE OF INSULIN (HCC): ICD-10-CM

## 2020-05-29 RX ORDER — SITAGLIPTIN AND METFORMIN HYDROCHLORIDE 50; 1000 MG/1; MG/1
TABLET, FILM COATED, EXTENDED RELEASE ORAL
Qty: 180 TAB | Refills: 3 | Status: SHIPPED | OUTPATIENT
Start: 2020-05-29

## 2020-05-29 NOTE — TELEPHONE ENCOUNTER
----- Message from Amanda Ibarra sent at 5/28/2020  5:45 PM EDT -----  Regarding: Dr. Jillian Oconnor (if not patient):      Relationship of caller (if not patient):      Best contact number(s):  [phone]      Name of medication and dosage if known:  \"Janumet 50mg/1000mg\"      Is patient out of this medication (yes/no): No.  Pt has two weeks remaining.       Pharmacy name:  [facility], [address] listed in chart? (yes/no):  Yes  Pharmacy phone number:      Details to clarify the request:      Amanda Ibarra

## 2020-07-22 ENCOUNTER — TELEPHONE (OUTPATIENT)
Dept: ENDOCRINOLOGY | Age: 40
End: 2020-07-22

## 2020-07-22 NOTE — TELEPHONE ENCOUNTER
----- Message from Ricardo Katz sent at 7/22/2020  1:09 PM EDT -----  Regarding: Dr Sakshi Corona first and last name:      Reason for call:pt would like to know if Dr Jannette Montanez has   janumet coupon, the current price without is $471, which she can not afford at this time     Callback required yes/no and why: yes for reason given above       Best contact number(s):[phone]      Details to clarify the request:pt said she does not have any of the Janumet left.       Ricardo Katz

## 2020-07-23 NOTE — TELEPHONE ENCOUNTER
Informed pt we do not have cards in the office but she can go to the [employer] website to obtain a coupon. Asked pt to return call if needed.

## 2022-03-19 PROBLEM — E11.40 TYPE 2 DIABETES MELLITUS WITH DIABETIC NEUROPATHY (HCC): Status: ACTIVE | Noted: 2018-05-09

## 2023-05-25 RX ORDER — GABAPENTIN 300 MG/1
300 CAPSULE ORAL 3 TIMES DAILY
COMMUNITY

## 2023-05-25 RX ORDER — LISINOPRIL 10 MG/1
10 TABLET ORAL DAILY
COMMUNITY

## 2023-05-25 RX ORDER — FEXOFENADINE HCL 180 MG/1
180 TABLET ORAL DAILY
COMMUNITY

## 2023-05-25 RX ORDER — FENOFIBRATE 48 MG/1
48 TABLET, COATED ORAL DAILY
COMMUNITY

## 2023-05-25 RX ORDER — SITAGLIPTIN AND METFORMIN HYDROCHLORIDE 1000; 50 MG/1; MG/1
1 TABLET, FILM COATED, EXTENDED RELEASE ORAL 2 TIMES DAILY
COMMUNITY
Start: 2020-05-29

## 2023-05-25 RX ORDER — ATORVASTATIN CALCIUM 10 MG/1
10 TABLET, FILM COATED ORAL DAILY
COMMUNITY